# Patient Record
Sex: FEMALE | Race: WHITE | NOT HISPANIC OR LATINO | URBAN - METROPOLITAN AREA
[De-identification: names, ages, dates, MRNs, and addresses within clinical notes are randomized per-mention and may not be internally consistent; named-entity substitution may affect disease eponyms.]

---

## 2021-03-04 ENCOUNTER — OFFICE VISIT (OUTPATIENT)
Dept: URBAN - METROPOLITAN AREA CLINIC 74 | Facility: CLINIC | Age: 66
End: 2021-03-04

## 2021-03-24 ENCOUNTER — OFFICE VISIT (OUTPATIENT)
Dept: URBAN - METROPOLITAN AREA CLINIC 74 | Facility: CLINIC | Age: 66
End: 2021-03-24

## 2024-02-08 ENCOUNTER — OV NP (OUTPATIENT)
Dept: URBAN - METROPOLITAN AREA CLINIC 74 | Facility: CLINIC | Age: 69
End: 2024-02-08
Payer: MEDICARE

## 2024-02-08 ENCOUNTER — LAB (OUTPATIENT)
Dept: URBAN - METROPOLITAN AREA CLINIC 74 | Facility: CLINIC | Age: 69
End: 2024-02-08

## 2024-02-08 VITALS
BODY MASS INDEX: 24.32 KG/M2 | WEIGHT: 146 LBS | HEIGHT: 65 IN | SYSTOLIC BLOOD PRESSURE: 110 MMHG | DIASTOLIC BLOOD PRESSURE: 60 MMHG | OXYGEN SATURATION: 97 % | TEMPERATURE: 98.1 F | HEART RATE: 108 BPM

## 2024-02-08 DIAGNOSIS — Z86.010 PERSONAL HISTORY OF COLONIC POLYPS: ICD-10-CM

## 2024-02-08 DIAGNOSIS — K57.30 DIVERTICULOSIS LARGE INTESTINE W/O PERFORATION OR ABSCESS W/O BLEEDING: ICD-10-CM

## 2024-02-08 DIAGNOSIS — R12 BURNING REFLUX: ICD-10-CM

## 2024-02-08 DIAGNOSIS — R19.5 POSITIVE COLORECTAL CANCER SCREENING USING COLOGUARD TEST: ICD-10-CM

## 2024-02-08 PROBLEM — 236069009: Status: ACTIVE | Noted: 2024-02-08

## 2024-02-08 PROBLEM — 386138005: Status: ACTIVE | Noted: 2024-02-08

## 2024-02-08 PROBLEM — 53741008: Status: ACTIVE | Noted: 2024-02-08

## 2024-02-08 PROBLEM — 428283002: Status: ACTIVE | Noted: 2024-02-08

## 2024-02-08 PROBLEM — 708699002: Status: ACTIVE | Noted: 2024-02-08

## 2024-02-08 PROCEDURE — 99204 OFFICE O/P NEW MOD 45 MIN: CPT | Performed by: INTERNAL MEDICINE

## 2024-02-08 RX ORDER — CLONAZEPAM 0.5 MG/1
TABLET ORAL
Qty: 90 TABLET | Status: ACTIVE | COMMUNITY

## 2024-02-08 RX ORDER — DULOXETINE 60 MG/1
CAPSULE, DELAYED RELEASE ORAL
Qty: 30 CAPSULE | Status: ACTIVE | COMMUNITY

## 2024-02-08 RX ORDER — ALBUTEROL SULFATE 90 UG/1
INHALE 2 PUFFS BY MOUTH EVERY 6 HOURS AS NEEDED FOR SHORTNESS OF BREATH AEROSOL, METERED RESPIRATORY (INHALATION)
Qty: 18 GRAM | Refills: 0 | Status: ACTIVE | COMMUNITY

## 2024-02-08 RX ORDER — PANTOPRAZOLE SODIUM 40 MG/1
TABLET, DELAYED RELEASE ORAL
OUTPATIENT

## 2024-02-08 RX ORDER — SODIUM, POTASSIUM,MAG SULFATES 17.5-3.13G
AS DIRECTED SOLUTION, RECONSTITUTED, ORAL ORAL
OUTPATIENT
Start: 2024-02-08

## 2024-02-08 RX ORDER — ZOLPIDEM TARTRATE 12.5 MG/1
TAKE 1 TABLET BY MOUTH ONCE DAILY AT BEDTIME TABLET, EXTENDED RELEASE ORAL
Qty: 30 EACH | Refills: 2 | Status: ACTIVE | COMMUNITY

## 2024-02-08 RX ORDER — ESCITALOPRAM 20 MG/1
TABLET, FILM COATED ORAL
Qty: 45 UNSPECIFIED | Status: ACTIVE | COMMUNITY

## 2024-02-08 RX ORDER — LEVOTHYROXINE SODIUM 50 UG/1
TABLET ORAL
Qty: 30 TABLET | Status: ACTIVE | COMMUNITY

## 2024-02-08 RX ORDER — ATORVASTATIN CALCIUM 40 MG/1
TABLET, FILM COATED ORAL
Qty: 90 TABLET | Status: ACTIVE | COMMUNITY

## 2024-02-08 RX ORDER — PANTOPRAZOLE SODIUM 40 MG/1
TABLET, DELAYED RELEASE ORAL
Qty: 90 TABLET | Status: ACTIVE | COMMUNITY

## 2024-02-08 NOTE — HPI-TODAY'S VISIT:
Today February 8, 2024 the patient returns for a visit, the patient was last seen on March 9, 2017 with change in bowel habits, diarrhea, bloating and gas pain, hematochezia, nausea, vomiting, dysphagia, presbyesophagus, history of chronic gastritis, heartburn, colonic diverticulosis, family history of colon polyps and colon cancer, tobacco abuse, chronic bronchitis and hypertension  At the time of the last visit the patient returned after having an EGD that showed a small hiatal hernia, presbyesophagus and H. pylori negative reactive gastropathy. The patient had a colonoscopy that showed colonic diverticulosis and internal hemorrhoids, random biopsies of the colon and ileal biopsies were normal.  Laboratory showed a normal CBC except for elevated platelets of 389,000 and a CRP of 13.9.   At the time the patient appeared to be doing better although continues to have epigastric abdominal pain, the patient was advised to get an ultrasound and remain on pantoprazole there is no ultrasound report.  Today the patient returns stating that she was found to have a positive Cologuard test.  The patient's family history significant for colonic cancer and colonic polyps on several members, in the past the patient did have colonic polyps.The patient's last colonoscopy in 2017 revealed sigmoid diverticulosis, medium size internal hemorrhoids, normal colonic mucosa and normal ileal mucosa.  In 2012 the patient was found to have hyperplastic sigmoid polyp as well as a rectal hyperplastic polyp mixed with lymphoid aggregate. The patient is currently complaining of constipation, she defecates type I or type II stools on the [place] scale every 2 to 3 days.  When she takes a stool softener or increase his fiber intake she defecates more frequently.  The patient denies having any rectal bleeding or hematochezia, there is no abdominal pain.  The patient does have intermittent abdominal bloating and increased flatulence when constipated. The patient also complaint of frequent gastroesophageal reflux and heartburn for which she takes pantoprazole 40 mg daily as prescribed by primary care. In the past the patient was found to have presbyesophagus and chronic gastritis H. pylori negative. Laboratory on December 7, 2023 revealed a white cell count of 6.06, hemoglobin 13.2 with a hematocrit of 41.2, normocytic normochromic indices.  The patient had normal renal function and normal LFTs. The patient has been recommended to increase her p.o. intake of fiber and water, the patient will remain on antireflux measures and diet, the patient will be scheduled to have both an EGD and a colonoscopy.  Benefits potential complications and alternatives to both procedures were disclosed.

## 2024-03-26 ENCOUNTER — COL/EGD (OUTPATIENT)
Dept: URBAN - METROPOLITAN AREA SURGERY CENTER 30 | Facility: SURGERY CENTER | Age: 69
End: 2024-03-26

## 2024-04-22 ENCOUNTER — OV EP (OUTPATIENT)
Dept: URBAN - METROPOLITAN AREA CLINIC 74 | Facility: CLINIC | Age: 69
End: 2024-04-22

## 2024-04-22 NOTE — HPI-TODAY'S VISIT:
The patient is 69-year-old female with past medical history as noted below known to Dr. Skaggs is presenting to our clinic today to discuss her recent EGD and colonoscopy results.  Procedures: -- EGD with biopsy on 03/26/2024 by Dr. Skaggs noted normal examined duodenum.  Erythematous mucosa in the antrum.  Small hiatal hernia.  Normal esophagus.  Biopsy of duodenum with no significant abnormality.  Stomach with no significant abnormality.  Esophagus with reflux type changes.  No Pascual's esophagus or eosinophilic esophagitis.  -- Colonoscopy polypectomy on 03/26/2024 by Dr. Skaggs noted preparation of the colon was fair. Three 1 to 2 mm polyps in the rectum, removed with a cold biopsy forcep.  Resected and retrieved.  Diverticulosis in the sigmoid colon.  Stool in sigmoid colon.  Biopsy with hyperplastic colon polyps.  Repeat colonoscopy at next available appointment in 3 months.